# Patient Record
Sex: MALE | Race: WHITE | NOT HISPANIC OR LATINO | URBAN - NONMETROPOLITAN AREA
[De-identification: names, ages, dates, MRNs, and addresses within clinical notes are randomized per-mention and may not be internally consistent; named-entity substitution may affect disease eponyms.]

---

## 2020-03-11 ENCOUNTER — IMPORTED ENCOUNTER (OUTPATIENT)
Dept: URBAN - NONMETROPOLITAN AREA CLINIC 1 | Facility: CLINIC | Age: 45
End: 2020-03-11

## 2020-03-11 PROBLEM — H52.223: Noted: 2020-03-11

## 2020-03-11 PROBLEM — H52.13: Noted: 2020-03-11

## 2020-03-11 PROBLEM — H52.4: Noted: 2020-03-11

## 2020-03-11 PROCEDURE — 92015 DETERMINE REFRACTIVE STATE: CPT

## 2020-03-11 PROCEDURE — 92004 COMPRE OPH EXAM NEW PT 1/>: CPT

## 2020-03-11 NOTE — PATIENT DISCUSSION
Compound Myopic Astigmatism OU w/Presbyopia-  discussed findings w/patient-  new spectacle Rx issued -  monitor yearly or prn; 's Notes: MR 3/11/2020DFE 3/11/2020

## 2022-04-09 ASSESSMENT — VISUAL ACUITY
OS_CC: 20/25-3
OD_CC: 20/30-

## 2022-04-09 ASSESSMENT — TONOMETRY
OD_IOP_MMHG: 14
OS_IOP_MMHG: 14